# Patient Record
(demographics unavailable — no encounter records)

---

## 2024-11-18 NOTE — ASSESSMENT
[FreeTextEntry1] : 87 y/o M Cantonese speaking, former smoker, w/ hx of A-fib on Xarelto, and Lung cancer.  Now~ 11 yrs s/p FB, mediastinoscopy, Lt thoracotomy, LULobectomy, pulmonary arterioplasty, MLND on 9/17/13. 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 Adj chemo by Dr. Alison Corral.   CT Chest on 11/12/24:  - post-op changes - stable moderate Lt pleural effusion  I have reviewed the patient's medical records and diagnostic images at time of this office consultation and have made the following recommendation: 1.   I, MARCIA Arrington, personally performed the evaluation and management (E/M) services for this established patient who presents today with (a) new problem(s)/exacerbation of (an) existing condition(s).  That E/M includes conducting the examination, assessing all new/exacerbated conditions, and establishing a new plan of care.  Today, my ACP, Linda Kaur, SOFIA-BC was here to observe my evaluation and management services for this new problem/exacerbated condition to be followed going forward.

## 2024-11-18 NOTE — HISTORY OF PRESENT ILLNESS
[FreeTextEntry1] : 89 y/o M Cantonese speaking, former smoker, w/ hx of A-fib on Xarelto, and Lung cancer.  Now~ 11 yrs s/p FB, mediastinoscopy, Lt thoracotomy, LULobectomy, pulmonary arterioplasty, MLND on 9/17/13. 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 Adj chemo by Dr. Alison Corral.  CT Chest on 4/1/23: - post-op changes - new ill-defined hazy nodular opacity in RML, 2.3 cm (4:185) - persistent small Lt pleural effusion  CT Chest on 11/16/23: (MSR) - Post op changes - Moderate left pleural effusion, unchanged - Previously described 2.3 cm hazy ill-defined pulmonary nodular opacity in the RML on 4/1/23 has resolved - Cardiomegaly   CT Chest on 11/12/24:  - post-op changes - stable moderate Lt pleural effusion  Pt presents today for follow up.

## 2024-11-18 NOTE — CONSULT LETTER
[Dear  ___] : Dear  [unfilled], [Consult Letter:] : I had the pleasure of evaluating your patient, [unfilled]. [( Thank you for referring [unfilled] for consultation for _____ )] : Thank you for referring [unfilled] for consultation for [unfilled] [Please see my note below.] : Please see my note below. [Consult Closing:] : Thank you very much for allowing me to participate in the care of this patient.  If you have any questions, please do not hesitate to contact me. [Sincerely,] : Sincerely, [FreeTextEntry2] : Haresh Dickinson MD (PCP/Referring)  [FreeTextEntry3] : Polo Castrejon MD, MPH \par  System Director of Thoracic Surgery \par  Director of Comprehensive Lung and Foregut [place] \par  Professor Cardiovascular & Thoracic Surgery  \par  [facility] [employer] at [facility]\par

## 2024-11-20 NOTE — CONSULT LETTER
[FreeTextEntry2] : Haresh Dickinson MD (PCP/Referring)  [FreeTextEntry3] : Polo Castrejon MD, MPH \par  System Director of Thoracic Surgery \par  Director of Comprehensive Lung and Foregut [place] \par  Professor Cardiovascular & Thoracic Surgery  \par  [facility] [employer] at [facility]\par

## 2024-11-20 NOTE — ASSESSMENT
[FreeTextEntry1] : 89 y/o M Cantonese speaking, former smoker, w/ hx of A-fib on Xarelto, and Lung cancer.  Now~ 11 yrs s/p FB, mediastinoscopy, Lt thoracotomy, LULobectomy, pulmonary arterioplasty, MLND on 9/17/13. 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 Adj chemo by Dr. Alison Corral.   CT Chest on 11/12/24:  - post-op changes - stable moderate Lt pleural effusion  I have reviewed the patient's medical records and diagnostic images at time of this office consultation and have made the following recommendation: 1.   I, MARCIA Arrington, personally performed the evaluation and management (E/M) services for this established patient who presents today with (a) new problem(s)/exacerbation of (an) existing condition(s).  That E/M includes conducting the examination, assessing all new/exacerbated conditions, and establishing a new plan of care.  Today, my ACP, Linda Kaur, SOFIA-BC was here to observe my evaluation and management services for this new problem/exacerbated condition to be followed going forward.

## 2024-11-20 NOTE — HISTORY OF PRESENT ILLNESS
[FreeTextEntry1] : 87 y/o M Cantonese speaking, former smoker, w/ hx of A-fib on Xarelto, and Lung cancer.  Now~ 11 yrs s/p FB, mediastinoscopy, Lt thoracotomy, LULobectomy, pulmonary arterioplasty, MLND on 9/17/13. 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 Adj chemo by Dr. Alison Corral.  CT Chest on 4/1/23: - post-op changes - new ill-defined hazy nodular opacity in RML, 2.3 cm (4:185) - persistent small Lt pleural effusion  CT Chest on 11/16/23: (MSR) - Post op changes - Moderate left pleural effusion, unchanged - Previously described 2.3 cm hazy ill-defined pulmonary nodular opacity in the RML on 4/1/23 has resolved - Cardiomegaly   CT Chest on 11/12/24:  - post-op changes - stable moderate Lt pleural effusion  Pt presents today for follow up.

## 2024-11-20 NOTE — CONSULT LETTER
[FreeTextEntry2] : Haresh Dickinson MD (PCP/Referring)  [FreeTextEntry3] : Polo Castrejon MD, MPH \par  System Director of Thoracic Surgery \par  Director of Comprehensive Lung and Foregut [place] \par  Professor Cardiovascular & Thoracic Surgery  \par  [facility] [employer] at [facility]\par